# Patient Record
(demographics unavailable — no encounter records)

---

## 2024-11-01 NOTE — DATA REVIEWED
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

## 2024-11-01 NOTE — HISTORY OF PRESENT ILLNESS
[FreeTextEntry1] : Pt presents for routine surveillance. History of atherosclerosis, PAD, mesenteric arterial occlusions, and chronic venous insufficiency. Pt experiences neuropathic, burning pain in both feel (R>L). Pain is better with elevation. Feet pain limits her walking. Pt states she had an episode of RLE cellulitis in June 2024, which was treated with oral antibiotics. Denies abdominal complaints. Pt eats well. Denies postprandial or unintentional weight loss. Reports stable bilateral ankle swelling. She wears compression stockings and elevates her legs. Denies claudication, rest pain, wounds or ulcers. Pt is taking atorvastatin and plavix.

## 2024-11-01 NOTE — ASSESSMENT
[Arterial/Venous Disease] : arterial/venous disease [Medication Management] : medication management [Foot care/Footwear] : foot care/footwear [FreeTextEntry1] : Impression - PAD, atherosclerosis, mesenteric arterial occlusions, celiac artery stenosis, venous insufficiency. Overall stable  seen and examined with Dr. Deng  Plan Conservative medical management - foot care and protection, leg elevation, calf muscle exercises, exercise regimen, weight loss/diet control, mild 15-20 mm hg knee high compression stockings Continue plavix and statin therapy Follow up in 6 months May 2025 to evaluate bilateral lower extremities with NATACHA/TBI and SMA/celiac with abdominal duplex

## 2024-11-01 NOTE — PHYSICAL EXAM
[2+] : left 2+ [0] : left 0 [Ankle Swelling (On Exam)] : present [Ankle Swelling Bilaterally] : bilaterally  [Varicose Veins Of Lower Extremities] : bilaterally [Ankle Swelling On The Left] : moderate [] : bilaterally [No Rash or Lesion] : No rash or lesion [Ankle Swelling On The Right] : mild [Alert] : alert [Oriented to Person] : oriented to person [Oriented to Place] : oriented to place [Oriented to Time] : oriented to time [Calm] : calm [de-identified] : NAD [de-identified] : NCAT [de-identified] : unlabored breathing [FreeTextEntry1] : abdomen soft and nontender  Bilateral lower extremities soft, warm and nontender stable bilateral ankle and feet edema mild venous stasis changes with dry skin and hyperpigmentation no wounds [de-identified] : uses a cane [de-identified] : cooperative

## 2025-05-12 NOTE — PHYSICAL EXAM
[2+] : left 2+ [0] : left 0 [Ankle Swelling (On Exam)] : present [Ankle Swelling Bilaterally] : bilaterally  [Varicose Veins Of Lower Extremities] : bilaterally [Ankle Swelling On The Left] : moderate [] : bilaterally [Ankle Swelling On The Right] : mild [No Rash or Lesion] : No rash or lesion [Alert] : alert [Oriented to Person] : oriented to person [Oriented to Place] : oriented to place [Oriented to Time] : oriented to time [Calm] : calm [Skin Ulcer] : ulcer [de-identified] : NAD [de-identified] : NCAT [de-identified] : unlabored breathing [FreeTextEntry1] : abdomen soft and nontender  Bilateral lower extremities soft, warm and nontender moderate leg edema R>L mild venous stasis changes with dry skin and hyperpigmentation distal right medial calf erythema and small wound 1 mm diam no purulence not warm to touch not painful to touch [de-identified] : uses a cane [de-identified] : cooperative

## 2025-05-12 NOTE — DATA REVIEWED
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

## 2025-05-12 NOTE — HISTORY OF PRESENT ILLNESS
[FreeTextEntry1] : 91 yo female presents for routine surveillance. History of atherosclerosis, PAD, mesenteric arterial occlusions, chronic venous insufficiency and RLE cellulitis treated with doxycycline. Reports she developed a small weeping wound on distal right medial calf x 1 day. She is concerned for cellulitis. Her legs are more swollen than usual. Denies leg pain but she feels burning sensation by the wound.  Denies injury or trauma.  She elevates her legs and is partially compliant with compression stockings. Denies GI or lower extremity claudication or rest pain. Pt is taking atorvastatin and plavix.

## 2025-05-12 NOTE — ASSESSMENT
[Arterial/Venous Disease] : arterial/venous disease [Medication Management] : medication management [Foot care/Footwear] : foot care/footwear [FreeTextEntry1] : Impression - PAD, atherosclerosis, mesenteric arterial occlusions, venous insufficiency, lymphedema, small wound on distal right medial calf, RLE cellulitis  seen and examined with Dr. Deng  Plan Conservative medical management - foot care and protection, leg elevation, calf muscle exercises, exercise regimen, weight loss/diet control, mild 15-20 mm hg knee high compression stockings Continue plavix and statin therapy Start doxycycline 100 mg BID x 10 days (rx sent) Pt was offered RLE unna boot therapy but refused She wants to cover it with band-aid and wear compression stockings instead Referral for SCD Follow up in 6 months November 2025 to evaluate bilateral lower extremities with NATACHA/TBI and SMA/celiac with abdominal duplex Follow up in 1 month for SCD measurements Telehealth in 1 week to evaluate RLE wound   Leg measurements right thigh 22 inches right calf 18 inches right ankle 14 inches left thigh 21 inches left calf 17 inches left ankle 14 inches [Ulcer Care] : ulcer care

## 2025-05-12 NOTE — HISTORY OF PRESENT ILLNESS
[FreeTextEntry1] : 89 yo female presents for routine surveillance. History of atherosclerosis, PAD, mesenteric arterial occlusions, chronic venous insufficiency and RLE cellulitis treated with doxycycline. Reports she developed a small weeping wound on distal right medial calf x 1 day. She is concerned for cellulitis. Her legs are more swollen than usual. Denies leg pain but she feels burning sensation by the wound.  Denies injury or trauma.  She elevates her legs and is partially compliant with compression stockings. Denies GI or lower extremity claudication or rest pain. Pt is taking atorvastatin and plavix.

## 2025-05-12 NOTE — PHYSICAL EXAM
[2+] : left 2+ [0] : left 0 [Ankle Swelling (On Exam)] : present [Ankle Swelling Bilaterally] : bilaterally  [Varicose Veins Of Lower Extremities] : bilaterally [Ankle Swelling On The Left] : moderate [] : bilaterally [Ankle Swelling On The Right] : mild [No Rash or Lesion] : No rash or lesion [Alert] : alert [Oriented to Person] : oriented to person [Oriented to Place] : oriented to place [Oriented to Time] : oriented to time [Calm] : calm [Skin Ulcer] : ulcer [de-identified] : NAD [de-identified] : NCAT [de-identified] : unlabored breathing [FreeTextEntry1] : abdomen soft and nontender  Bilateral lower extremities soft, warm and nontender moderate leg edema R>L mild venous stasis changes with dry skin and hyperpigmentation distal right medial calf erythema and small wound 1 mm diam no purulence not warm to touch not painful to touch [de-identified] : uses a cane [de-identified] : cooperative

## 2025-05-12 NOTE — REVIEW OF SYSTEMS
[Lower Ext Edema] : lower extremity edema [As Noted in HPI] : as noted in HPI [Skin Wound] : skin wound [Negative] : Neurological

## 2025-05-15 NOTE — PHYSICAL EXAM
[Alert] : alert [Oriented to Person] : oriented to person [Oriented to Place] : oriented to place [Oriented to Time] : oriented to time [Calm] : calm [de-identified] : NAD

## 2025-05-15 NOTE — HISTORY OF PRESENT ILLNESS
[Home] : at home, [unfilled] , at the time of the visit. [Medical Office: ([facility])___] : at the medical office located in  [Telephone (audio)] : This telephonic visit was provided via audio only technology. [Patient preference] : patient preference. [Verbal consent obtained from patient] : the patient, [unfilled] [FreeTextEntry1] : Telephonic visit  Follow up on RLE cellulitis. History of atherosclerosis, PAD, mesenteric arterial occlusions, chronic venous insufficiency and previous RLE cellulitis treated with doxycycline.  Pt states RLE erythema is subsiding. Weeping stopped. Small wound is still present but is getting better. Denies leg pain. Pt bought new pair of compression stockings recently. She is taking doxycycline 100 mg BID and is tolerating well. No other complaints. Denies GI or lower extremity claudication or rest pain. Pt is taking atorvastatin and plavix. Awaiting [facility] approval.

## 2025-05-15 NOTE — ASSESSMENT
[FreeTextEntry1] : Impression - PAD, atherosclerosis, mesenteric arterial occlusions, venous insufficiency, lymphedema, small wound on distal right medial calf, RLE cellulitis is improving with doxycycline    Plan Conservative medical management - foot care and protection, leg elevation, calf muscle exercises, exercise regimen, weight loss/diet control, mild 15-20 mm hg knee high compression stockings Continue plavix and statin therapy finish course of doxycycline 100 mg BID x 10 days  Referral for SCD Follow up in 6 months November 2025 to evaluate bilateral lower extremities with NATACHA/TBI and SMA/celiac with abdominal duplex Follow up in 1 month for SCD measurements    Leg measurements right thigh 22 inches right calf 18 inches right ankle 14 inches left thigh 21 inches left calf 17 inches left ankle 14 inches [Arterial/Venous Disease] : arterial/venous disease [Medication Management] : medication management [Foot care/Footwear] : foot care/footwear [Ulcer Care] : ulcer care

## 2025-06-13 NOTE — HISTORY OF PRESENT ILLNESS
[FreeTextEntry1] : 91 yo female presents to evaluate right lower extremity. History of atherosclerosis, PAD, mesenteric arterial occlusions, chronic venous insufficiency and previous RLE cellulitis treated with doxycycline.  Reports weeping distal right medial calf wound x 2 days. Denies pain, tenderness or increased redness.  Denies leg pain. Denies GI or lower extremity claudication or rest pain. She elevates her legs daily. Partially compliant with compression stockings. Pt is taking atorvastatin and plavix. Awaiting SCD approval.

## 2025-06-13 NOTE — ASSESSMENT
[FreeTextEntry1] : Impression - PAD, atherosclerosis, mesenteric arterial occlusions, venous insufficiency, lymphedema, new distal right medial calf weeping wound x 2 days, mild cellulitis    Plan Conservative medical management - foot care and protection, leg elevation, calf muscle exercises, exercise regimen, weight loss/diet control, mild 15-20 mm hg knee high compression stockings Apply bandaid and compression stockings over the wound. Continue plavix and statin therapy start doxycycline 100 mg BID x 10 days (rx sent) Referral for SCD Follow up in 5 months November 2025 to evaluate bilateral lower extremities with NATACHA/TBI and SMA/celiac with abdominal duplex Return to office next week to evaluate RLE wound    Leg measurements right thigh 22 inches right calf 18.5 inches right ankle 12 inches left thigh 21 inches left calf 17 inches left ankle 11 inches [Arterial/Venous Disease] : arterial/venous disease [Medication Management] : medication management [Foot care/Footwear] : foot care/footwear [Ulcer Care] : ulcer care

## 2025-06-13 NOTE — PHYSICAL EXAM
[2+] : left 2+ [0] : right 0 [Ankle Swelling (On Exam)] : present [Ankle Swelling Bilaterally] : bilaterally  [Varicose Veins Of Lower Extremities] : bilaterally [Ankle Swelling On The Left] : moderate [] : bilaterally [Ankle Swelling On The Right] : mild [Skin Ulcer] : ulcer [Alert] : alert [Oriented to Person] : oriented to person [Oriented to Place] : oriented to place [Oriented to Time] : oriented to time [Calm] : calm [de-identified] : NAD [de-identified] : NCAT [de-identified] : unlabored breathing [FreeTextEntry1] : abdomen soft and nontender  Bilateral lower extremities soft, warm and nontender moderate leg edema R>L mild venous stasis changes with dry skin and hyperpigmentation distal right medial calf wound light drainage  macerated skin not warm or tender to touch [de-identified] : cooperative

## 2025-06-20 NOTE — PHYSICAL EXAM
[2+] : left 2+ [0] : left 0 [Ankle Swelling (On Exam)] : present [Ankle Swelling Bilaterally] : bilaterally  [Varicose Veins Of Lower Extremities] : bilaterally [Ankle Swelling On The Left] : moderate [] : bilaterally [Ankle Swelling On The Right] : mild [Skin Ulcer] : ulcer [Alert] : alert [Oriented to Person] : oriented to person [Oriented to Place] : oriented to place [Oriented to Time] : oriented to time [Calm] : calm [de-identified] : NAD [de-identified] : NCAT [de-identified] : unlabored breathing [FreeTextEntry1] : abdomen soft and nontender  Bilateral lower extremities soft, warm and nontender moderate leg edema R>L mild venous stasis changes with dry skin and hyperpigmentation distal right medial calf wound is improving no drainage noted [de-identified] : cooperative

## 2025-06-20 NOTE — ASSESSMENT
[FreeTextEntry1] : Impression - PAD, atherosclerosis, mesenteric arterial occlusions, venous insufficiency, right medial calf wound is improving, cellulitis resolving    Plan Conservative medical management - foot care and protection, leg elevation, calf muscle exercises, exercise regimen, weight loss/diet control, mild 15-20 mm hg knee high compression wraps (rx given), SCD Apply bandaid and compression wraps over the wound. Continue plavix and statin therapy finish doxycycline 100 mg BID. pt has 3 days left of doxy Follow up in 5 months November 2025 to evaluate bilateral lower extremities with NATACHA/TBI and SMA/celiac with abdominal duplex     Leg measurements right thigh 22 inches right calf 18.5 inches right ankle 12 inches left thigh 21 inches left calf 17 inches left ankle 11 inches [Arterial/Venous Disease] : arterial/venous disease [Medication Management] : medication management [Foot care/Footwear] : foot care/footwear [Ulcer Care] : ulcer care

## 2025-06-20 NOTE — HISTORY OF PRESENT ILLNESS
[FreeTextEntry1] : 89 yo female presents to evaluate right lower extremity. History of atherosclerosis, PAD, mesenteric arterial occlusions, chronic venous insufficiency and previous RLE cellulitis treated with doxycycline.  The wound on the right distal medial calf is healing well. Pain has improved. Drainage stopped. She has 3 more days of doxycycline 100 mg BID left. Reports her legs are feeling better overall. Her SCD arrived on Tuesday and has been using it 2x/day for up to 1 hour. She already sees an improvement on her legs. She is unable to tolerate compression stockings because they are too tight. Compliant with leg elevation. Denies GI or lower extremity claudication or rest pain.  She takes atorvastatin and plavix.